# Patient Record
Sex: MALE | Race: WHITE | NOT HISPANIC OR LATINO | Employment: OTHER | URBAN - METROPOLITAN AREA
[De-identification: names, ages, dates, MRNs, and addresses within clinical notes are randomized per-mention and may not be internally consistent; named-entity substitution may affect disease eponyms.]

---

## 2017-10-11 ENCOUNTER — HOSPITAL ENCOUNTER (OUTPATIENT)
Dept: FAMILY MEDICINE CLINIC | Facility: CLINIC | Age: 62
Setting detail: SPECIMEN
Discharge: HOME | End: 2017-10-11
Attending: FAMILY MEDICINE | Admitting: FAMILY MEDICINE

## 2017-10-11 LAB
ALBUMIN SERPL-MCNC: 3.7 G/DL (ref 3.5–4.8)
ALBUMIN/GLOB SERPL: 1.5 {RATIO} (ref 1–1.7)
ALP SERPL-CCNC: 75 IU/L (ref 32–91)
ALT SERPL-CCNC: 18 IU/L (ref 17–63)
ANION GAP SERPL CALC-SCNC: 8.9 MMOL/L (ref 10–20)
AST SERPL-CCNC: 20 IU/L (ref 15–41)
BASOPHILS # BLD AUTO: 0.1 10*3/UL (ref 0–0.2)
BASOPHILS NFR BLD AUTO: 1 % (ref 0–2)
BILIRUB SERPL-MCNC: 0.5 MG/DL (ref 0.3–1.2)
BUN SERPL-MCNC: 12 MG/DL (ref 8–20)
BUN/CREAT SERPL: 10.9 (ref 6.2–20.3)
CALCIUM SERPL-MCNC: 8.9 MG/DL (ref 8.9–10.3)
CHLORIDE SERPL-SCNC: 103 MMOL/L (ref 101–111)
CONV CO2: 29 MMOL/L (ref 22–32)
CONV TOTAL PROTEIN: 6.1 G/DL (ref 6.1–7.9)
CREAT UR-MCNC: 1.1 MG/DL (ref 0.7–1.2)
DIFFERENTIAL METHOD BLD: (no result)
EOSINOPHIL # BLD AUTO: 0.2 10*3/UL (ref 0–0.3)
EOSINOPHIL # BLD AUTO: 2 % (ref 0–3)
ERYTHROCYTE [DISTWIDTH] IN BLOOD BY AUTOMATED COUNT: 13.2 % (ref 11.5–14.5)
GLOBULIN UR ELPH-MCNC: 2.4 G/DL (ref 2.5–3.8)
GLUCOSE SERPL-MCNC: 117 MG/DL (ref 65–99)
HCT VFR BLD AUTO: 40.6 % (ref 40–54)
HGB BLD-MCNC: 14.3 G/DL (ref 14–18)
LYMPHOCYTES # BLD AUTO: 1.7 10*3/UL (ref 0.8–4.8)
LYMPHOCYTES NFR BLD AUTO: 22 % (ref 18–42)
MCH RBC QN AUTO: 31 PG (ref 26–32)
MCHC RBC AUTO-ENTMCNC: 35.1 G/DL (ref 32–36)
MCV RBC AUTO: 88.2 FL (ref 80–94)
MONOCYTES # BLD AUTO: 0.5 10*3/UL (ref 0.1–1.3)
MONOCYTES NFR BLD AUTO: 6 % (ref 2–11)
NEUTROPHILS # BLD AUTO: 5.6 10*3/UL (ref 2.3–8.6)
NEUTROPHILS NFR BLD AUTO: 69 % (ref 50–75)
NRBC BLD AUTO-RTO: 0 /100{WBCS}
NRBC/RBC NFR BLD MANUAL: 0 10*3/UL
PLATELET # BLD AUTO: 232 10*3/UL (ref 150–450)
PMV BLD AUTO: 7.7 FL (ref 7.4–10.4)
POTASSIUM SERPL-SCNC: 3.9 MMOL/L (ref 3.6–5.1)
RBC # BLD AUTO: 4.6 10*6/UL (ref 4.6–6)
SODIUM SERPL-SCNC: 137 MMOL/L (ref 136–144)
WBC # BLD AUTO: 8.1 10*3/UL (ref 4.5–11.5)

## 2018-03-05 ENCOUNTER — HOSPITAL ENCOUNTER (OUTPATIENT)
Dept: CT IMAGING | Facility: HOSPITAL | Age: 63
Discharge: HOME | End: 2018-03-05
Attending: FAMILY MEDICINE | Admitting: FAMILY MEDICINE

## 2018-03-05 ENCOUNTER — HOSPITAL ENCOUNTER (OUTPATIENT)
Dept: FAMILY MEDICINE CLINIC | Facility: CLINIC | Age: 63
Setting detail: SPECIMEN
Discharge: HOME | End: 2018-03-05
Attending: FAMILY MEDICINE | Admitting: FAMILY MEDICINE

## 2018-03-05 LAB
ALBUMIN SERPL-MCNC: 3.7 G/DL (ref 3.5–4.8)
ALBUMIN/GLOB SERPL: 1.4 {RATIO} (ref 1–1.7)
ALP SERPL-CCNC: 81 IU/L (ref 32–91)
ALT SERPL-CCNC: 18 IU/L (ref 17–63)
ANION GAP SERPL CALC-SCNC: 9.6 MMOL/L (ref 10–20)
AST SERPL-CCNC: 19 IU/L (ref 15–41)
BASOPHILS # BLD AUTO: 0.1 10*3/UL (ref 0–0.2)
BASOPHILS NFR BLD AUTO: 1 % (ref 0–2)
BILIRUB SERPL-MCNC: 0.5 MG/DL (ref 0.3–1.2)
BILIRUB UR QL STRIP: NEGATIVE MG/DL
BUN SERPL-MCNC: 14 MG/DL (ref 8–20)
BUN/CREAT SERPL: 12.7 (ref 6.2–20.3)
CALCIUM SERPL-MCNC: 8.7 MG/DL (ref 8.9–10.3)
CASTS URNS QL MICRO: NORMAL /[LPF]
CHLORIDE SERPL-SCNC: 102 MMOL/L (ref 101–111)
COLOR UR: YELLOW
CONV BACTERIA IN URINE MICRO: NEGATIVE
CONV CLARITY OF URINE: CLEAR
CONV CO2: 29 MMOL/L (ref 22–32)
CONV HYALINE CASTS IN URINE MICRO: 0 /[LPF] (ref 0–5)
CONV PROTEIN IN URINE BY AUTOMATED TEST STRIP: NEGATIVE MG/DL
CONV SMALL ROUND CELLS: NORMAL /[HPF]
CONV TOTAL PROTEIN: 6.3 G/DL (ref 6.1–7.9)
CONV UROBILINOGEN IN URINE BY AUTOMATED TEST STRIP: 0.2 MG/DL
CREAT BLDA-MCNC: 1.1 MG/DL (ref 0.6–1.3)
CREAT UR-MCNC: 1.1 MG/DL (ref 0.7–1.2)
CULTURE INDICATED?: NORMAL
DIFFERENTIAL METHOD BLD: (no result)
EOSINOPHIL # BLD AUTO: 0.3 10*3/UL (ref 0–0.3)
EOSINOPHIL # BLD AUTO: 4 % (ref 0–3)
ERYTHROCYTE [DISTWIDTH] IN BLOOD BY AUTOMATED COUNT: 13.4 % (ref 11.5–14.5)
ERYTHROCYTE [SEDIMENTATION RATE] IN BLOOD BY WESTERGREN METHOD: 4 MM/HR (ref 0–20)
GLOBULIN UR ELPH-MCNC: 2.6 G/DL (ref 2.5–3.8)
GLUCOSE SERPL-MCNC: 86 MG/DL (ref 65–99)
GLUCOSE UR QL: NEGATIVE MG/DL
HCT VFR BLD AUTO: 43.5 % (ref 40–54)
HGB BLD-MCNC: 14.7 G/DL (ref 14–18)
HGB UR QL STRIP: NEGATIVE
KETONES UR QL STRIP: NEGATIVE MG/DL
LEUKOCYTE ESTERASE UR QL STRIP: NEGATIVE
LYMPHOCYTES # BLD AUTO: 2.9 10*3/UL (ref 0.8–4.8)
LYMPHOCYTES NFR BLD AUTO: 43 % (ref 18–42)
MAGNESIUM SERPL-MCNC: 2.1 MG/DL (ref 1.8–2.5)
MCH RBC QN AUTO: 31.8 PG (ref 26–32)
MCHC RBC AUTO-ENTMCNC: 33.8 G/DL (ref 32–36)
MCV RBC AUTO: 94.1 FL (ref 80–94)
MONOCYTES # BLD AUTO: 0.6 10*3/UL (ref 0.1–1.3)
MONOCYTES NFR BLD AUTO: 9 % (ref 2–11)
NEUTROPHILS # BLD AUTO: 3 10*3/UL (ref 2.3–8.6)
NEUTROPHILS NFR BLD AUTO: 43 % (ref 50–75)
NITRITE UR QL STRIP: NEGATIVE
NRBC BLD AUTO-RTO: 0 /100{WBCS}
NRBC/RBC NFR BLD MANUAL: 0 10*3/UL
PH UR STRIP.AUTO: 6.5 [PH] (ref 4.5–8)
PLATELET # BLD AUTO: 244 10*3/UL (ref 150–450)
PMV BLD AUTO: 7.1 FL (ref 7.4–10.4)
POTASSIUM SERPL-SCNC: 3.6 MMOL/L (ref 3.6–5.1)
RBC # BLD AUTO: 4.62 10*6/UL (ref 4.6–6)
RBC #/AREA URNS HPF: 0 /[HPF] (ref 0–3)
SODIUM SERPL-SCNC: 137 MMOL/L (ref 136–144)
SP GR UR: 1.02 (ref 1–1.03)
SPERM URNS QL MICRO: NORMAL /[HPF]
SQUAMOUS SPT QL MICRO: 0 /[HPF] (ref 0–5)
UNIDENT CRYS URNS QL MICRO: NORMAL /[HPF]
WBC # BLD AUTO: 6.9 10*3/UL (ref 4.5–11.5)
WBC #/AREA URNS HPF: 0 /[HPF] (ref 0–5)
YEAST SPEC QL WET PREP: NORMAL /[HPF]

## 2019-05-21 ENCOUNTER — HOSPITAL ENCOUNTER (OUTPATIENT)
Dept: PREADMISSION TESTING | Facility: HOSPITAL | Age: 64
Discharge: HOME | End: 2019-05-21
Attending: SURGERY | Admitting: SURGERY

## 2019-05-21 LAB
ANION GAP SERPL CALC-SCNC: 15.8 MMOL/L (ref 10–20)
BASOPHILS # BLD AUTO: 0.1 10*3/UL (ref 0–0.2)
BASOPHILS NFR BLD AUTO: 1 % (ref 0–2)
BUN SERPL-MCNC: 9 MG/DL (ref 8–20)
BUN/CREAT SERPL: 8.2 (ref 6.2–20.3)
CALCIUM SERPL-MCNC: 9.4 MG/DL (ref 8.9–10.3)
CHLORIDE SERPL-SCNC: 102 MMOL/L (ref 101–111)
CONV CO2: 25 MMOL/L (ref 22–32)
CREAT UR-MCNC: 1.1 MG/DL (ref 0.7–1.2)
DIFFERENTIAL METHOD BLD: (no result)
EOSINOPHIL # BLD AUTO: 0.3 10*3/UL (ref 0–0.3)
EOSINOPHIL # BLD AUTO: 4 % (ref 0–3)
ERYTHROCYTE [DISTWIDTH] IN BLOOD BY AUTOMATED COUNT: 12.6 % (ref 11.5–14.5)
GLUCOSE SERPL-MCNC: 95 MG/DL (ref 65–99)
HCT VFR BLD AUTO: 47.7 % (ref 40–54)
HGB BLD-MCNC: 16 G/DL (ref 14–18)
LYMPHOCYTES # BLD AUTO: 1.5 10*3/UL (ref 0.8–4.8)
LYMPHOCYTES NFR BLD AUTO: 19 % (ref 18–42)
MCH RBC QN AUTO: 32.7 PG (ref 26–32)
MCHC RBC AUTO-ENTMCNC: 33.7 G/DL (ref 32–36)
MCV RBC AUTO: 97.3 FL (ref 80–94)
MICRO REPORT STATUS: NORMAL
MONOCYTES # BLD AUTO: 0.5 10*3/UL (ref 0.1–1.3)
MONOCYTES NFR BLD AUTO: 7 % (ref 2–11)
NEUTROPHILS # BLD AUTO: 5.4 10*3/UL (ref 2.3–8.6)
NEUTROPHILS NFR BLD AUTO: 69 % (ref 50–75)
NRBC BLD AUTO-RTO: 0 /100{WBCS}
NRBC/RBC NFR BLD MANUAL: 0 10*3/UL
PLATELET # BLD AUTO: 279 10*3/UL (ref 150–450)
PMV BLD AUTO: 7.7 FL (ref 7.4–10.4)
POTASSIUM SERPL-SCNC: 3.8 MMOL/L (ref 3.6–5.1)
RBC # BLD AUTO: 4.9 10*6/UL (ref 4.6–6)
SODIUM SERPL-SCNC: 139 MMOL/L (ref 136–144)
WBC # BLD AUTO: 7.7 10*3/UL (ref 4.5–11.5)

## 2019-06-03 ENCOUNTER — HOSPITAL ENCOUNTER (OUTPATIENT)
Dept: PREOP | Facility: HOSPITAL | Age: 64
Setting detail: HOSPITAL OUTPATIENT SURGERY
Discharge: HOME | End: 2019-06-03
Attending: SURGERY | Admitting: SURGERY

## 2019-06-21 PROBLEM — I95.1 ORTHOSTATIC HYPOTENSION: Status: ACTIVE | Noted: 2017-03-22

## 2019-06-21 PROBLEM — M54.50 LOW BACK PAIN: Status: ACTIVE | Noted: 2018-03-05

## 2019-06-21 PROBLEM — R10.9 ABDOMINAL PAIN: Status: ACTIVE | Noted: 2018-03-05

## 2019-06-21 PROBLEM — R14.0 ABDOMINAL DISTENSION: Status: ACTIVE | Noted: 2018-03-05

## 2019-06-21 PROBLEM — J45.909 ASTHMA: Status: ACTIVE | Noted: 2019-02-04

## 2019-06-21 PROBLEM — J32.9 SINUSITIS, CHRONIC: Status: ACTIVE | Noted: 2019-02-04

## 2019-06-21 PROBLEM — H54.7 REDUCED VISUAL ACUITY: Status: ACTIVE | Noted: 2017-03-22

## 2019-06-21 PROBLEM — D35.2: Status: ACTIVE | Noted: 2017-03-22

## 2019-06-21 PROBLEM — K42.9 UMBILICAL HERNIA: Status: ACTIVE | Noted: 2019-02-04

## 2019-06-21 PROBLEM — D35.3: Status: ACTIVE | Noted: 2017-03-22

## 2019-06-24 ENCOUNTER — OFFICE VISIT (OUTPATIENT)
Dept: SURGERY | Facility: CLINIC | Age: 64
End: 2019-06-24

## 2019-06-24 VITALS
WEIGHT: 177.6 LBS | HEIGHT: 68 IN | TEMPERATURE: 97.7 F | OXYGEN SATURATION: 99 % | BODY MASS INDEX: 26.92 KG/M2 | SYSTOLIC BLOOD PRESSURE: 153 MMHG | HEART RATE: 75 BPM | DIASTOLIC BLOOD PRESSURE: 87 MMHG

## 2019-06-24 DIAGNOSIS — K42.0 UMBILICAL HERNIA WITH OBSTRUCTION, WITHOUT GANGRENE: Primary | ICD-10-CM

## 2019-06-24 PROCEDURE — 99024 POSTOP FOLLOW-UP VISIT: CPT | Performed by: SURGERY

## 2019-06-24 RX ORDER — MOMETASONE FUROATE 50 UG/1
SPRAY, METERED NASAL
COMMUNITY
Start: 2013-06-05

## 2019-06-24 RX ORDER — DESMOPRESSIN ACETATE 0.1 MG/1
TABLET ORAL EVERY 24 HOURS
COMMUNITY
Start: 2019-02-04

## 2019-06-24 RX ORDER — LEVOTHYROXINE SODIUM 0.05 MG/1
88 TABLET ORAL EVERY 24 HOURS
COMMUNITY
Start: 2018-03-05

## 2019-06-24 RX ORDER — NAPROXEN 500 MG/1
TABLET ORAL EVERY 12 HOURS
COMMUNITY
Start: 2019-02-04 | End: 2019-08-03

## 2019-06-24 RX ORDER — HYDROCORTISONE 10 MG/1
TABLET ORAL EVERY 24 HOURS
COMMUNITY
Start: 2018-03-05

## 2019-06-24 RX ORDER — TESTOSTERONE GEL, 1% 10 MG/G
GEL TRANSDERMAL
COMMUNITY
Start: 2019-02-25

## 2019-06-24 RX ORDER — ALBUTEROL SULFATE 90 UG/1
AEROSOL, METERED RESPIRATORY (INHALATION)
COMMUNITY
Start: 2018-09-28

## 2019-06-24 NOTE — PROGRESS NOTES
SUBJECTIVE:    Mr. Curtis is seen in the office today in follow-up from his laparoscopic umbilical hernia repair with mesh.  He is about 3 weeks postop.  He is doing very well.  No fever or chills.  No nausea or vomiting.    OBJECTIVE:    Incisions are healing appropriately without infection.  Of course she is most sore with the for permanent stitches come through the muscle.  That is to be expected.    ASSESSMENT:    Satisfactory postop progress    PLAN:    Recheck in the office as needed.

## 2021-02-05 ENCOUNTER — TRANSCRIBE ORDERS (OUTPATIENT)
Dept: LAB | Facility: HOSPITAL | Age: 66
End: 2021-02-05

## 2021-02-05 ENCOUNTER — LAB (OUTPATIENT)
Dept: LAB | Facility: HOSPITAL | Age: 66
End: 2021-02-05

## 2021-02-05 DIAGNOSIS — D44.0 TERATOMA OF UNCERTAIN BEHAVIOR OF THYROID GLAND: ICD-10-CM

## 2021-02-05 DIAGNOSIS — E23.0 PANHYPOPITUITARISM (HCC): ICD-10-CM

## 2021-02-05 DIAGNOSIS — D44.0 TERATOMA OF UNCERTAIN BEHAVIOR OF THYROID GLAND: Primary | ICD-10-CM

## 2021-02-05 LAB
ALBUMIN SERPL-MCNC: 4 G/DL (ref 3.5–5.2)
ALBUMIN/GLOB SERPL: 1.5 G/DL
ALP SERPL-CCNC: 52 U/L (ref 39–117)
ALT SERPL W P-5'-P-CCNC: 12 U/L (ref 1–41)
ANION GAP SERPL CALCULATED.3IONS-SCNC: 6.4 MMOL/L (ref 5–15)
AST SERPL-CCNC: 13 U/L (ref 1–40)
BILIRUB SERPL-MCNC: 0.5 MG/DL (ref 0–1.2)
BUN SERPL-MCNC: 12 MG/DL (ref 8–23)
BUN/CREAT SERPL: 12.1 (ref 7–25)
CALCIUM SPEC-SCNC: 9.3 MG/DL (ref 8.6–10.5)
CHLORIDE SERPL-SCNC: 100 MMOL/L (ref 98–107)
CO2 SERPL-SCNC: 30.6 MMOL/L (ref 22–29)
CREAT SERPL-MCNC: 0.99 MG/DL (ref 0.76–1.27)
GFR SERPL CREATININE-BSD FRML MDRD: 76 ML/MIN/1.73
GLOBULIN UR ELPH-MCNC: 2.7 GM/DL
GLUCOSE SERPL-MCNC: 119 MG/DL (ref 65–99)
HCT VFR BLD AUTO: 46.5 % (ref 37.5–51)
HGB BLD-MCNC: 16 G/DL (ref 13–17.7)
POTASSIUM SERPL-SCNC: 3.9 MMOL/L (ref 3.5–5.2)
PROLACTIN SERPL-MCNC: 26.4 NG/ML (ref 4.04–15.2)
PROT SERPL-MCNC: 6.7 G/DL (ref 6–8.5)
PSA SERPL-MCNC: 2.3 NG/ML (ref 0–4)
SODIUM SERPL-SCNC: 137 MMOL/L (ref 136–145)
T4 FREE SERPL-MCNC: 1.3 NG/DL (ref 0.93–1.7)

## 2021-02-05 PROCEDURE — 84402 ASSAY OF FREE TESTOSTERONE: CPT

## 2021-02-05 PROCEDURE — 36415 COLL VENOUS BLD VENIPUNCTURE: CPT

## 2021-02-05 PROCEDURE — 80053 COMPREHEN METABOLIC PANEL: CPT

## 2021-02-05 PROCEDURE — 85014 HEMATOCRIT: CPT

## 2021-02-05 PROCEDURE — G0103 PSA SCREENING: HCPCS

## 2021-02-05 PROCEDURE — 84403 ASSAY OF TOTAL TESTOSTERONE: CPT

## 2021-02-05 PROCEDURE — 84439 ASSAY OF FREE THYROXINE: CPT

## 2021-02-05 PROCEDURE — 84146 ASSAY OF PROLACTIN: CPT

## 2021-02-05 PROCEDURE — 85018 HEMOGLOBIN: CPT

## 2021-02-11 LAB
TESTOST FREE SERPL-MCNC: 3.8 PG/ML (ref 6.6–18.1)
TESTOST SERPL-MCNC: 248.5 NG/DL (ref 264–916)

## 2022-07-18 ENCOUNTER — OFFICE VISIT (OUTPATIENT)
Dept: FAMILY MEDICINE CLINIC | Facility: CLINIC | Age: 67
End: 2022-07-18

## 2022-07-18 ENCOUNTER — LAB (OUTPATIENT)
Dept: FAMILY MEDICINE CLINIC | Facility: CLINIC | Age: 67
End: 2022-07-18

## 2022-07-18 VITALS
DIASTOLIC BLOOD PRESSURE: 98 MMHG | BODY MASS INDEX: 29.02 KG/M2 | SYSTOLIC BLOOD PRESSURE: 130 MMHG | WEIGHT: 170 LBS | HEART RATE: 85 BPM | RESPIRATION RATE: 16 BRPM | HEIGHT: 64 IN | OXYGEN SATURATION: 99 % | TEMPERATURE: 97.6 F

## 2022-07-18 DIAGNOSIS — E29.1 HYPOGONADISM IN MALE: ICD-10-CM

## 2022-07-18 DIAGNOSIS — E89.3 HYPOPITUITARISM AFTER ADENOMA RESECTION: ICD-10-CM

## 2022-07-18 DIAGNOSIS — D35.3: Primary | ICD-10-CM

## 2022-07-18 DIAGNOSIS — I10 ESSENTIAL HYPERTENSION: ICD-10-CM

## 2022-07-18 DIAGNOSIS — H54.40 BLINDNESS OF LEFT EYE WITH NORMAL VISION IN CONTRALATERAL EYE: ICD-10-CM

## 2022-07-18 DIAGNOSIS — Z12.5 SPECIAL SCREENING FOR MALIGNANT NEOPLASM OF PROSTATE: ICD-10-CM

## 2022-07-18 DIAGNOSIS — Z98.2 VP (VENTRICULOPERITONEAL) SHUNT STATUS: ICD-10-CM

## 2022-07-18 DIAGNOSIS — D35.2: Primary | ICD-10-CM

## 2022-07-18 DIAGNOSIS — K21.9 GASTROESOPHAGEAL REFLUX DISEASE WITHOUT ESOPHAGITIS: ICD-10-CM

## 2022-07-18 DIAGNOSIS — T78.40XS ALLERGY, SEQUELA: ICD-10-CM

## 2022-07-18 PROBLEM — T78.40XA ALLERGIES: Status: ACTIVE | Noted: 2022-07-18

## 2022-07-18 PROBLEM — J45.909 ASTHMA: Status: RESOLVED | Noted: 2019-02-04 | Resolved: 2022-07-18

## 2022-07-18 PROBLEM — I95.1 ORTHOSTATIC HYPOTENSION: Status: RESOLVED | Noted: 2017-03-22 | Resolved: 2022-07-18

## 2022-07-18 PROBLEM — R14.0 ABDOMINAL DISTENSION: Status: RESOLVED | Noted: 2018-03-05 | Resolved: 2022-07-18

## 2022-07-18 PROBLEM — J32.9 SINUSITIS, CHRONIC: Status: RESOLVED | Noted: 2019-02-04 | Resolved: 2022-07-18

## 2022-07-18 PROBLEM — R10.9 ABDOMINAL PAIN: Status: RESOLVED | Noted: 2018-03-05 | Resolved: 2022-07-18

## 2022-07-18 PROBLEM — M54.50 LOW BACK PAIN: Status: RESOLVED | Noted: 2018-03-05 | Resolved: 2022-07-18

## 2022-07-18 LAB
BASOPHILS # BLD AUTO: 0.08 10*3/MM3 (ref 0–0.2)
BASOPHILS NFR BLD AUTO: 1.1 % (ref 0–1.5)
DEPRECATED RDW RBC AUTO: 41.3 FL (ref 37–54)
EOSINOPHIL # BLD AUTO: 0.36 10*3/MM3 (ref 0–0.4)
EOSINOPHIL NFR BLD AUTO: 5 % (ref 0.3–6.2)
ERYTHROCYTE [DISTWIDTH] IN BLOOD BY AUTOMATED COUNT: 12 % (ref 12.3–15.4)
HCT VFR BLD AUTO: 52.1 % (ref 37.5–51)
HGB BLD-MCNC: 17.8 G/DL (ref 13–17.7)
IMM GRANULOCYTES # BLD AUTO: 0.02 10*3/MM3 (ref 0–0.05)
IMM GRANULOCYTES NFR BLD AUTO: 0.3 % (ref 0–0.5)
LYMPHOCYTES # BLD AUTO: 2.33 10*3/MM3 (ref 0.7–3.1)
LYMPHOCYTES NFR BLD AUTO: 32.5 % (ref 19.6–45.3)
MCH RBC QN AUTO: 32.4 PG (ref 26.6–33)
MCHC RBC AUTO-ENTMCNC: 34.2 G/DL (ref 31.5–35.7)
MCV RBC AUTO: 94.7 FL (ref 79–97)
MONOCYTES # BLD AUTO: 0.71 10*3/MM3 (ref 0.1–0.9)
MONOCYTES NFR BLD AUTO: 9.9 % (ref 5–12)
NEUTROPHILS NFR BLD AUTO: 3.68 10*3/MM3 (ref 1.7–7)
NEUTROPHILS NFR BLD AUTO: 51.2 % (ref 42.7–76)
NRBC BLD AUTO-RTO: 0 /100 WBC (ref 0–0.2)
PLATELET # BLD AUTO: 213 10*3/MM3 (ref 140–450)
PMV BLD AUTO: 9.1 FL (ref 6–12)
PSA SERPL-MCNC: 3.41 NG/ML (ref 0–4)
RBC # BLD AUTO: 5.5 10*6/MM3 (ref 4.14–5.8)
WBC NRBC COR # BLD: 7.18 10*3/MM3 (ref 3.4–10.8)

## 2022-07-18 PROCEDURE — 36415 COLL VENOUS BLD VENIPUNCTURE: CPT | Performed by: FAMILY MEDICINE

## 2022-07-18 PROCEDURE — G0103 PSA SCREENING: HCPCS | Performed by: FAMILY MEDICINE

## 2022-07-18 PROCEDURE — 99214 OFFICE O/P EST MOD 30 MIN: CPT | Performed by: FAMILY MEDICINE

## 2022-07-18 PROCEDURE — 85025 COMPLETE CBC W/AUTO DIFF WBC: CPT | Performed by: FAMILY MEDICINE

## 2022-07-18 NOTE — PROGRESS NOTES
Chief Complaint  Annual Exam    Subjective      Vinny Curtis presents to [facility]  For annual exam. Has been seeing specialist at [facility].    The patient presents today for a follow-up.    Plantar fasciitis  The patient states that he is doing well. He states that he called in 04/2022 because he was having a problem with his foot. He states that he was told that he could not get in to see me until now. The patient states that he had plantar fasciitis. He states that he saw a podiatrist. The patient states that he had x-rays done and was told that his foot was fine. He states that he was diagnosed with plantar fasciitis. The patient states that he was given an injection in the side of his foot, which was very painful. He states that he went back and that is about all they did. The patient states that he puts soles in his shoes.  He states that he has not been here in quite a while.     The patient states that he had blood work done approximately 3 to 4 months ago. He states that he sees Dr. Mark, Endocrinology.     Benign tumor of the pituitary gland   The patient states he had to go back in and have another head surgery after his major surgery in 2020. He states he was hospitalized for 1 day and started radiation for 1 month in 06/2021. He states that he had another MRI.    Health maintenance  The patient states that his hearing is very good. He states that he has a little bit of ringing. The patient states that he can hear conversations very well. He states that he gets his eyes checked every year. The patient states that he has no peripheral vision in his left eye. He states that it has been that way since his first surgery in 2018. The patient states no problem with his right eye. He notes he has small cataracts.    The patient states that he walks a lot. He states that he likes to fish, but getting out and do it is tough. The patient states that he used to go  "camping a lot, but he has not been doing that too much lately.  The patient states that he quit smoking 20 years ago. He states that he smoked for 10 years.     The patient states that he sees his neurosurgeons every 6 months. He states that he has an appointment in 12/2021. The patient states that he will have an MRI. He states since the radiation treatment his sinuses has improved. He states he rarely uses his inhaler. He states he still has a shunt placed in his brain.    Objective   Vital Signs:  /98 (BP Location: Right arm)   Pulse 85   Temp 97.6 °F (36.4 °C) (Infrared)   Resp 16   Ht 162.6 cm (64\")   Wt 77.1 kg (170 lb)   SpO2 99%   BMI 29.18 kg/m²   Estimated body mass index is 29.18 kg/m² as calculated from the following:    Height as of this encounter: 162.6 cm (64\").    Weight as of this encounter: 77.1 kg (170 lb).    BMI is >= 25 and <30. (Overweight) The following options were offered after discussion;: weight loss educational material (shared in after visit summary), exercise counseling/recommendations and nutrition counseling/recommendations      Physical Exam  Constitutional:       Appearance: Normal appearance. He is well-developed and normal weight.   HENT:      Head: Normocephalic and atraumatic.      Right Ear: Tympanic membrane, ear canal and external ear normal.      Left Ear: Tympanic membrane, ear canal and external ear normal.      Nose: Nose normal.      Mouth/Throat:      Mouth: Mucous membranes are moist.      Pharynx: Oropharynx is clear. No oropharyngeal exudate.   Eyes:      Extraocular Movements: Extraocular movements intact.      Conjunctiva/sclera: Conjunctivae normal.      Pupils: Pupils are equal, round, and reactive to light.   Cardiovascular:      Rate and Rhythm: Normal rate and regular rhythm.      Pulses: Normal pulses.      Heart sounds: Normal heart sounds.   Pulmonary:      Effort: Pulmonary effort is normal.      Breath sounds: Normal breath sounds. "   Abdominal:      General: Bowel sounds are normal.      Palpations: Abdomen is soft.   Musculoskeletal:         General: Normal range of motion.      Cervical back: Normal range of motion and neck supple.   Skin:     General: Skin is warm and dry.   Neurological:      General: No focal deficit present.      Mental Status: He is alert and oriented to person, place, and time. Mental status is at baseline.   Psychiatric:         Mood and Affect: Mood normal.         Behavior: Behavior normal.         Thought Content: Thought content normal.         Judgment: Judgment normal.        COPD (chronic obstructive pulmonary disease)(Confirmed)   Active       Craniopharyngioma(Confirmed)   Active       GERD (gastroesophageal reflux disease)(Confirmed)   Active       S/P  shunt with Certas valve set at 2(Confirmed) 3/14/17 Active       HTN (hypertension)(Confirmed)   Active       Hx. of Presbyopia(Confirmed) 3/23/21 Active       Seasonal allergies(Confirmed)                 Assessment and Plan     1. 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 He has done reasonably well since the procedure several years ago, although he did lose vision in his left eye from the original procedure. His right eye vision is excellent. He continues to be seen by the neurosurgeons and an endocrinologist both about every 6 months.    2.  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  - This was from the surgery that he had on the craniopharyngioma. This is all very stable.    3. Essential hypertension  - The patient has hypertension and is being monitored now with his home readings. He is not on any medicine for it because he is running in the high 130s mmHg, low 140s mmHg systolic, and diastolics are in the mid 80s mmHg most of the time. If he goes up any bit higher than  that, he is going to let me know and we are going to put him on some blood pressure medicine. Right now,  I do not think he needs anything.    4. Gastroesophageal reflux without esophagitis  - The patient is not having symptoms, so he is not on a PPI or a H2 blocker. When he does have symptoms, we do start to these medicines for him, but he has not needed these for a while.    5. Ventriculoperitoneal shunt  - On the right side under his scalp and the shunt itself is run down into his peritoneum on the right side. The shunt is working and his pressure is apparently relieved.    6. Seasonal allergies  - The patient takes cetirizine daily and has an albuterol inhaler should he start wheezing. He has not wheezed in a long time he states. He also uses Nasonex. The combination cetirizine and Nasonex has kept him under excellent control.    7. Polycythemia  - The patient is taking replacement testosterone and the endocrinologist has him in the high normal range. He is showing some signs of polycythemia, probably from the high testosterone, so I have asked him to get blood at the [facility] and to take a baby aspirin every day. I will recheck his CBC to make sure he has not gotten too high, but that is something he is going to have to monitor.    8. Panhypopituitarism  - This was present after his surgery. He is taking his thyroid replacement along with hydrocortisone 10 mg a day and DDAVP 0.1 mg a day. He's doing well. and seems to be functioning fine on this replacement therapy.          Follow Up Return in about 6 months (around 1/18/2023) for Recheck.  Patient was given instructions and counseling regarding his condition or for health maintenance advice. Please see specific information pulled into the AVS if appropriate.     Transcribed from ambient dictation for Musa Kearns MD by NASIMA CLIFTON.  07/18/22   11:44 EDT    Patient verbalized consent to the visit recording.  I have personally performed the  services described in this document as transcribed by the above individual, and it is both accurate and complete.  Musa Kearns MD  7/24/2022  10:02 EDT

## 2025-02-24 ENCOUNTER — HOSPITAL ENCOUNTER (EMERGENCY)
Facility: HOSPITAL | Age: 70
Discharge: HOME | End: 2025-02-24
Attending: EMERGENCY MEDICINE | Admitting: EMERGENCY MEDICINE
Payer: COMMERCIAL

## 2025-02-24 ENCOUNTER — APPOINTMENT (OUTPATIENT)
Dept: GENERAL RADIOLOGY | Facility: HOSPITAL | Age: 70
End: 2025-02-24
Payer: COMMERCIAL

## 2025-02-24 VITALS
WEIGHT: 174.16 LBS | HEART RATE: 90 BPM | BODY MASS INDEX: 26.4 KG/M2 | SYSTOLIC BLOOD PRESSURE: 141 MMHG | DIASTOLIC BLOOD PRESSURE: 78 MMHG | TEMPERATURE: 98.5 F | OXYGEN SATURATION: 97 % | HEIGHT: 68 IN | RESPIRATION RATE: 20 BRPM

## 2025-02-24 DIAGNOSIS — S20.212A CHEST WALL CONTUSION, LEFT, INITIAL ENCOUNTER: Primary | ICD-10-CM

## 2025-02-24 PROCEDURE — 71101 X-RAY EXAM UNILAT RIBS/CHEST: CPT

## 2025-02-24 PROCEDURE — 99283 EMERGENCY DEPT VISIT LOW MDM: CPT

## 2025-02-24 RX ORDER — HYDROCODONE BITARTRATE AND ACETAMINOPHEN 5; 325 MG/1; MG/1
1 TABLET ORAL ONCE AS NEEDED
Status: COMPLETED | OUTPATIENT
Start: 2025-02-24 | End: 2025-02-24

## 2025-02-24 RX ORDER — HYDROCODONE BITARTRATE AND ACETAMINOPHEN 7.5; 325 MG/1; MG/1
TABLET ORAL
Qty: 12 TABLET | Refills: 0 | Status: SHIPPED | OUTPATIENT
Start: 2025-02-24

## 2025-02-24 RX ADMIN — HYDROCODONE BITARTRATE AND ACETAMINOPHEN 1 TABLET: 5; 325 TABLET ORAL at 19:19

## 2025-02-24 NOTE — ED NOTES
Pt reports was walking up an incline on a sidewalk and tripped and fell and caught himself with his bilateral hands, pt reports he scraped his left knee and felt a pop to his left side rib area. Denies hitting head, no blood thinner use.

## 2025-02-24 NOTE — ED PROVIDER NOTES
Subjective     Chief Complaint   Patient presents with    Fall     Fall today, left side pain, pt was at urgent care and sent in for eval. Pt pointing to ribs and abd.         Provider in Triage Note  Pt presents today s/p mechanical fall at approximately 220 pm today. Pt complains of pain along left lateral intercostal area and left lateral side of abdomen. Pt states some shortness of breath that has happened since the fall.  Patient denies other complaints at this time; no chest pain, no abdominal pain.      History of Present Illness  Provider in triage information is included in the history of present illness.  The patient reports that he has not developed shortness of breath but states that hurts to take a deep breath he states he has had no swelling to his chest wall.  He states that he had a twisting motion as he attempted to catch himself he reports no hand pain he reports no decrease in sensation or circulation he denies other injuries.  Denies abdominal pain nausea or vomiting        Review of Systems    Past Medical History:   Diagnosis Date    Asthma     Craniopharyngioma        No Known Allergies    Past Surgical History:   Procedure Laterality Date    BRAIN SURGERY         Family History   Problem Relation Age of Onset    Other Mother         DJD    Other Brother         BRAIN TUMOR    Other Daughter         DJD       Social History     Socioeconomic History    Marital status:    Tobacco Use    Smoking status: Former     Current packs/day: 0.00     Types: Cigarettes     Quit date:      Years since quittin.    Smokeless tobacco: Never   Vaping Use    Vaping status: Never Used   Substance and Sexual Activity    Alcohol use: Yes     Comment: beer    Drug use: Yes     Types: Marijuana           Objective   Physical Exam  Alert [place] Coma Scale 15   HEENT: Pupils equal and reactive to light. Conjunctivae are not injected. Normal tympanic membranes. Oropharynx and nares are normal.   Neck:  Supple. Midline trachea. No JVD. No goiter.   Chest: Diffuse chest discomfort is noted on the left side in the anterior axillary line to midclavicular line in the mid lung fields on the left there is no subcutaneous air or crepitance and equal breath sounds bilaterally, regular rate and rhythm without murmur or rub.   Abdomen: Positive bowel sounds, nontender, nondistended. No rebound or peritoneal signs. No CVA tenderness.  There is no pain to deep left upper quadrant palpation  Extremities no clubbing. cyanosis or edema. Motor sensory exam is normal. The full range of motion is intact   Skin: Warm and dry, no rashes or petechia.   Lymphatic: No regional lymphadenopathy. No calf pain, swelling or Homans sign    Procedures           ED Course                                             Labs Reviewed - No data to display  Medications   HYDROcodone-acetaminophen (NORCO) 5-325 MG per tablet 1 tablet (has no administration in time range)     XR Ribs Left With PA Chest    Result Date: 2/24/2025  Impression: 1.No acute cardiopulmonary abnormality is identified. 2.No acute rib fractures are seen. Several chronic-appearing left-sided rib fracture deformities are seen. 3.7 mm nodular focus projecting over the left lung base, possibly a nipple shadow. Follow-up chest x-ray using nipple markers or chest CT may be considered. Electronically Signed: Geoffrey Rowe MD  2/24/2025 6:08 PM EST  Workstation ID: [id number]             Medical Decision Making  Patient was given hydrocodone in the emergency department is given a prescription for diclofenac and hydrocodone precautions were discussed at home.  The patient was stable at discharge and vocalized understanding of discharge instructions and warning    Amount and/or Complexity of Data Reviewed  Radiology: ordered and independent interpretation performed.    Risk  OTC drugs.  Prescription drug management.        Final diagnoses:   Chest wall contusion, left, initial encounter        ED Disposition  ED Disposition       ED Disposition   Discharge    Condition   Stable    Comment   --               No follow-up provider specified.       Medication List        New Prescriptions      diclofenac 50 MG EC tablet  Commonly known as: VOLTAREN  Take 1 tablet by mouth 2 (Two) Times a Day.     HYDROcodone-acetaminophen 7.5-325 MG per tablet  Commonly known as: NORCO  One half or 1 p.o. every 6 hours as needed moderate or severe pain               Where to Get Your Medications        These medications were sent to [facility] [id number] [place] [address] [address]  - [phone] [facility] [phone] [address] [place] [address]      Phone: [phone]   diclofenac 50 MG EC tablet  HYDROcodone-acetaminophen 7.5-325 MG per tablet            Roberto Scott MD  02/24/25 [id number]

## 2025-02-25 NOTE — DISCHARGE INSTRUCTIONS
Rest no activity work or exertion for the next 3 days  No lifting or pulling greater than 20 pounds for the next 2 weeks  Every hour that you are awake take a deep breath and cough  Medication as directed you can also use Tylenol for discomfort

## 2025-04-02 DIAGNOSIS — R91.1 LUNG NODULE: Primary | ICD-10-CM

## 2025-04-03 ENCOUNTER — TELEPHONE (OUTPATIENT)
Dept: FAMILY MEDICINE CLINIC | Facility: CLINIC | Age: 70
End: 2025-04-03
Payer: COMMERCIAL

## 2025-04-03 NOTE — TELEPHONE ENCOUNTER
RELAY:  Attempted to call patient but phone is not ringing. If he calls in please give him the following message from Dr. Kearns:      ----- Message from Musa Kearns sent at 4/2/2025  5:36 PM EDT -----  Elin tapia Vinny that he has a lung nodule on chest x-ray.  I want him to have a CT scan of the chest without contrast to evaluate this.  It is likely just scar tissue but we need to prove that.

## 2025-04-03 NOTE — TELEPHONE ENCOUNTER
Tried calling patient at 1:29pm and the phone never rings. Just dead air. Will try again later today.

## 2025-04-03 NOTE — TELEPHONE ENCOUNTER
Tried calling patient at 3:35pm and the phone never rings. Just dead air. Will try again in the morning.

## 2025-04-04 NOTE — TELEPHONE ENCOUNTER
Tried calling patient at 9:55am and the phone never rings. Just dead air. Will try again later today.

## 2025-04-04 NOTE — TELEPHONE ENCOUNTER
Tried calling patient at 4:17pm and the phone never rings. Just dead air.       Elin - do you want me to call the spouse under demographics?

## 2025-04-07 NOTE — TELEPHONE ENCOUNTER
I took one last chance and just called him and got thru. Gave patient message at 8:51am. He said his insurance had already contacted him and said it would be MUCH cheaper for him to have this CT Scan at [facility] so patient would like this order sent to [facility] today please.

## 2025-04-08 ENCOUNTER — TELEPHONE (OUTPATIENT)
Dept: FAMILY MEDICINE CLINIC | Facility: CLINIC | Age: 70
End: 2025-04-08
Payer: COMMERCIAL

## 2025-04-08 NOTE — TELEPHONE ENCOUNTER
PATIENT CALLED AND STATES THE CT SCAN OF CHEST BEING SENT TO [facility] HAS NOT BEEN RECEIVED     CALL BACK [facility] [phone]

## 2025-04-08 NOTE — TELEPHONE ENCOUNTER
"Please let patient know this was sent successfully yesterday:    \"Referred To Location/POSFax Sent Successfully Yesterday 09:15:24 AM\"    I've re-faxed it now, if they still do not have it by tomorrow morning, would recommend he stop by the office to  a copy of his order to take to Jakob for scheduling. Thank you.        "